# Patient Record
Sex: FEMALE | Race: WHITE
[De-identification: names, ages, dates, MRNs, and addresses within clinical notes are randomized per-mention and may not be internally consistent; named-entity substitution may affect disease eponyms.]

---

## 2017-12-14 ENCOUNTER — HOSPITAL ENCOUNTER (OUTPATIENT)
Dept: HOSPITAL 17 - CPRE | Age: 77
End: 2017-12-14
Attending: OBSTETRICS & GYNECOLOGY
Payer: MEDICARE

## 2017-12-14 DIAGNOSIS — Z01.810: Primary | ICD-10-CM

## 2017-12-14 DIAGNOSIS — C54.1: ICD-10-CM

## 2017-12-14 DIAGNOSIS — Z01.811: ICD-10-CM

## 2017-12-14 DIAGNOSIS — Z01.812: ICD-10-CM

## 2017-12-14 LAB
ALP SERPL-CCNC: 67 U/L (ref 45–117)
ALT SERPL-CCNC: 32 U/L (ref 10–53)
ANION GAP SERPL CALC-SCNC: 4 MEQ/L (ref 5–15)
APTT BLD: 24.7 SEC (ref 24.3–30.1)
AST SERPL-CCNC: 29 U/L (ref 15–37)
BASOPHILS # BLD AUTO: 0 TH/MM3 (ref 0–0.2)
BASOPHILS NFR BLD: 0.6 % (ref 0–2)
BILIRUB SERPL-MCNC: 0.4 MG/DL (ref 0.2–1)
BUN SERPL-MCNC: 8 MG/DL (ref 7–18)
CHLORIDE SERPL-SCNC: 100 MEQ/L (ref 98–107)
EOSINOPHIL # BLD: 0.1 TH/MM3 (ref 0–0.4)
EOSINOPHIL NFR BLD: 1.7 % (ref 0–4)
ERYTHROCYTE [DISTWIDTH] IN BLOOD BY AUTOMATED COUNT: 13.3 % (ref 11.6–17.2)
GFR SERPLBLD BASED ON 1.73 SQ M-ARVRAT: 86 ML/MIN (ref 89–?)
HCO3 BLD-SCNC: 29.7 MEQ/L (ref 21–32)
HCT VFR BLD CALC: 38.2 % (ref 35–46)
HEMO FLAGS: (no result)
INR PPP: 1 RATIO
LYMPHOCYTES # BLD AUTO: 2 TH/MM3 (ref 1–4.8)
LYMPHOCYTES NFR BLD AUTO: 28.8 % (ref 9–44)
MCH RBC QN AUTO: 31.3 PG (ref 27–34)
MCHC RBC AUTO-ENTMCNC: 33.8 % (ref 32–36)
MCV RBC AUTO: 92.7 FL (ref 80–100)
MONOCYTES NFR BLD: 13 % (ref 0–8)
NEUTROPHILS # BLD AUTO: 3.9 TH/MM3 (ref 1.8–7.7)
NEUTROPHILS NFR BLD AUTO: 55.9 % (ref 16–70)
PLATELET # BLD: 289 TH/MM3 (ref 150–450)
POTASSIUM SERPL-SCNC: 4.6 MEQ/L (ref 3.5–5.1)
PROTHROMBIN TIME: 10 SEC (ref 9.8–11.6)
RBC # BLD AUTO: 4.12 MIL/MM3 (ref 4–5.3)
SODIUM SERPL-SCNC: 134 MEQ/L (ref 136–145)
WBC # BLD AUTO: 7.1 TH/MM3 (ref 4–11)

## 2017-12-14 PROCEDURE — 80053 COMPREHEN METABOLIC PANEL: CPT

## 2017-12-14 PROCEDURE — 85025 COMPLETE CBC W/AUTO DIFF WBC: CPT

## 2017-12-14 PROCEDURE — 93005 ELECTROCARDIOGRAM TRACING: CPT

## 2017-12-14 PROCEDURE — 36415 COLL VENOUS BLD VENIPUNCTURE: CPT

## 2017-12-14 PROCEDURE — 85610 PROTHROMBIN TIME: CPT

## 2017-12-14 PROCEDURE — 85730 THROMBOPLASTIN TIME PARTIAL: CPT

## 2017-12-14 PROCEDURE — 71020: CPT

## 2017-12-14 NOTE — RADRPT
EXAM DATE/TIME:  12/14/2017 15:59 

 

[facility] COMPARISON:     

No previous studies available for comparison.

 

                     

INDICATIONS :     

Evaluate for pneumonia, pneumothorax, or communicable disease. Pre op hysteractomy.

                     

 

MEDICAL HISTORY :     

None.          

 

SURGICAL HISTORY :     

None.   

 

ENCOUNTER:     

Initial                                        

 

ACUITY:     

1 day      

 

PAIN SCORE:     

0/10

 

LOCATION:     

Bilateral chest 

 

FINDINGS:     

PA and lateral views of the chest demonstrate the lungs to be symmetrically aerated without evidence 
of mass, infiltrate or effusion.  The cardiomediastinal contours are unremarkable.  Osseous structure
s are intact.

 

CONCLUSION:     

1. No acute cardiopulmonary disease.

 

 

 

 Nicholas Jeter MD on December 14, 2017 at 16:23           

Board Certified Radiologist.

 This report was verified electronically.

## 2017-12-16 NOTE — EKG
Date Performed: 12/14/2017       Time Performed: 15:13:08

 

PTAGE:      77 years

 

EKG:      Sinus rhythm 

 

 Normal ECG 

 

NO PREVIOUS TRACING            

 

DOCTOR:   Mary Beckett  Interpretating Date/Time  12/16/2017 12:16:43

## 2018-01-11 ENCOUNTER — HOSPITAL ENCOUNTER (OUTPATIENT)
Dept: HOSPITAL 17 - HSDC | Age: 78
Setting detail: OBSERVATION
LOS: 1 days | Discharge: HOME | End: 2018-01-12
Attending: OBSTETRICS & GYNECOLOGY | Admitting: OBSTETRICS & GYNECOLOGY
Payer: MEDICARE

## 2018-01-11 VITALS
SYSTOLIC BLOOD PRESSURE: 122 MMHG | TEMPERATURE: 98.3 F | HEART RATE: 77 BPM | RESPIRATION RATE: 17 BRPM | DIASTOLIC BLOOD PRESSURE: 57 MMHG | OXYGEN SATURATION: 98 %

## 2018-01-11 VITALS — HEART RATE: 80 BPM

## 2018-01-11 VITALS
TEMPERATURE: 98.4 F | OXYGEN SATURATION: 98 % | SYSTOLIC BLOOD PRESSURE: 138 MMHG | RESPIRATION RATE: 18 BRPM | DIASTOLIC BLOOD PRESSURE: 77 MMHG | HEART RATE: 85 BPM

## 2018-01-11 VITALS — HEART RATE: 82 BPM

## 2018-01-11 VITALS
TEMPERATURE: 98 F | OXYGEN SATURATION: 100 % | HEART RATE: 87 BPM | RESPIRATION RATE: 20 BRPM | SYSTOLIC BLOOD PRESSURE: 150 MMHG | DIASTOLIC BLOOD PRESSURE: 71 MMHG

## 2018-01-11 VITALS — HEART RATE: 84 BPM

## 2018-01-11 VITALS — HEIGHT: 68 IN | WEIGHT: 114.64 LBS | BODY MASS INDEX: 17.37 KG/M2

## 2018-01-11 VITALS — HEART RATE: 86 BPM

## 2018-01-11 DIAGNOSIS — E78.5: ICD-10-CM

## 2018-01-11 DIAGNOSIS — N80.0: ICD-10-CM

## 2018-01-11 DIAGNOSIS — N95.0: ICD-10-CM

## 2018-01-11 DIAGNOSIS — I10: ICD-10-CM

## 2018-01-11 DIAGNOSIS — Z79.82: ICD-10-CM

## 2018-01-11 DIAGNOSIS — C54.1: Primary | ICD-10-CM

## 2018-01-11 PROCEDURE — 80048 BASIC METABOLIC PNL TOTAL CA: CPT

## 2018-01-11 PROCEDURE — C1769 GUIDE WIRE: HCPCS

## 2018-01-11 PROCEDURE — 00840 ANES IPER PX LOWER ABD NOS: CPT

## 2018-01-11 PROCEDURE — 85025 COMPLETE CBC W/AUTO DIFF WBC: CPT

## 2018-01-11 PROCEDURE — 86900 BLOOD TYPING SEROLOGIC ABO: CPT

## 2018-01-11 PROCEDURE — 96376 TX/PRO/DX INJ SAME DRUG ADON: CPT

## 2018-01-11 PROCEDURE — 94150 VITAL CAPACITY TEST: CPT

## 2018-01-11 PROCEDURE — 96374 THER/PROPH/DIAG INJ IV PUSH: CPT

## 2018-01-11 PROCEDURE — G0378 HOSPITAL OBSERVATION PER HR: HCPCS

## 2018-01-11 PROCEDURE — 58552 LAPARO-VAG HYST INCL T/O: CPT

## 2018-01-11 PROCEDURE — 96375 TX/PRO/DX INJ NEW DRUG ADDON: CPT

## 2018-01-11 PROCEDURE — 88309 TISSUE EXAM BY PATHOLOGIST: CPT

## 2018-01-11 PROCEDURE — 88112 CYTOPATH CELL ENHANCE TECH: CPT

## 2018-01-11 PROCEDURE — 86850 RBC ANTIBODY SCREEN: CPT

## 2018-01-11 PROCEDURE — 86901 BLOOD TYPING SEROLOGIC RH(D): CPT

## 2018-01-11 PROCEDURE — 88331 PATH CONSLTJ SURG 1 BLK 1SPC: CPT

## 2018-01-11 PROCEDURE — 96361 HYDRATE IV INFUSION ADD-ON: CPT

## 2018-01-11 RX ADMIN — CLONIDINE HYDROCHLORIDE SCH MG: 0.1 INJECTION, SOLUTION EPIDURAL at 21:17

## 2018-01-11 RX ADMIN — POTASSIUM CHLORIDE, DEXTROSE MONOHYDRATE AND SODIUM CHLORIDE SCH MLS/HR: 150; 5; 450 INJECTION, SOLUTION INTRAVENOUS at 21:17

## 2018-01-11 RX ADMIN — POTASSIUM CHLORIDE, DEXTROSE MONOHYDRATE AND SODIUM CHLORIDE SCH MLS/HR: 150; 5; 450 INJECTION, SOLUTION INTRAVENOUS at 11:00

## 2018-01-11 RX ADMIN — CLONIDINE HYDROCHLORIDE SCH MG: 0.1 INJECTION, SOLUTION EPIDURAL at 15:30

## 2018-01-12 VITALS
TEMPERATURE: 98.4 F | OXYGEN SATURATION: 97 % | RESPIRATION RATE: 16 BRPM | HEART RATE: 76 BPM | DIASTOLIC BLOOD PRESSURE: 62 MMHG | SYSTOLIC BLOOD PRESSURE: 129 MMHG

## 2018-01-12 VITALS
TEMPERATURE: 97.7 F | HEART RATE: 80 BPM | RESPIRATION RATE: 16 BRPM | OXYGEN SATURATION: 98 % | SYSTOLIC BLOOD PRESSURE: 123 MMHG | DIASTOLIC BLOOD PRESSURE: 66 MMHG

## 2018-01-12 VITALS
OXYGEN SATURATION: 96 % | DIASTOLIC BLOOD PRESSURE: 58 MMHG | SYSTOLIC BLOOD PRESSURE: 117 MMHG | HEART RATE: 82 BPM | RESPIRATION RATE: 16 BRPM | TEMPERATURE: 98.6 F

## 2018-01-12 LAB
BASOPHILS # BLD AUTO: 0 TH/MM3 (ref 0–0.2)
BASOPHILS NFR BLD: 0.2 % (ref 0–2)
BUN SERPL-MCNC: 16 MG/DL (ref 7–18)
CALCIUM SERPL-MCNC: 7.9 MG/DL (ref 8.5–10.1)
CHLORIDE SERPL-SCNC: 102 MEQ/L (ref 98–107)
CREAT SERPL-MCNC: 0.77 MG/DL (ref 0.5–1)
EOSINOPHIL # BLD: 0 TH/MM3 (ref 0–0.4)
EOSINOPHIL NFR BLD: 0 % (ref 0–4)
ERYTHROCYTE [DISTWIDTH] IN BLOOD BY AUTOMATED COUNT: 13.2 % (ref 11.6–17.2)
GFR SERPLBLD BASED ON 1.73 SQ M-ARVRAT: 72 ML/MIN (ref 89–?)
GLUCOSE SERPL-MCNC: 168 MG/DL (ref 74–106)
HCO3 BLD-SCNC: 21.7 MEQ/L (ref 21–32)
HCT VFR BLD CALC: 31.8 % (ref 35–46)
HGB BLD-MCNC: 11 GM/DL (ref 11.6–15.3)
LYMPHOCYTES # BLD AUTO: 1.3 TH/MM3 (ref 1–4.8)
LYMPHOCYTES NFR BLD AUTO: 11 % (ref 9–44)
MCH RBC QN AUTO: 31.7 PG (ref 27–34)
MCHC RBC AUTO-ENTMCNC: 34.6 % (ref 32–36)
MCV RBC AUTO: 91.6 FL (ref 80–100)
MONOCYTE #: 1.5 TH/MM3 (ref 0–0.9)
MONOCYTES NFR BLD: 13.2 % (ref 0–8)
NEUTROPHILS # BLD AUTO: 8.9 TH/MM3 (ref 1.8–7.7)
NEUTROPHILS NFR BLD AUTO: 75.6 % (ref 16–70)
PLATELET # BLD: 237 TH/MM3 (ref 150–450)
PMV BLD AUTO: 7.6 FL (ref 7–11)
RBC # BLD AUTO: 3.47 MIL/MM3 (ref 4–5.3)
SODIUM SERPL-SCNC: 132 MEQ/L (ref 136–145)
WBC # BLD AUTO: 11.7 TH/MM3 (ref 4–11)

## 2018-01-12 RX ADMIN — CLONIDINE HYDROCHLORIDE SCH MG: 0.1 INJECTION, SOLUTION EPIDURAL at 08:43

## 2018-01-12 RX ADMIN — CLONIDINE HYDROCHLORIDE SCH MG: 0.1 INJECTION, SOLUTION EPIDURAL at 04:25

## 2018-01-12 NOTE — MP
cc:

LAURA GUTIERREZ MD, JOHN D. M.D. BROWN,ABDOULAYE ANDERSEN M.D.

****

 

 

DATE OF SURGERY:

1/11/2018

 

PREOPERATIVE DIAGNOSIS:

Grade 1 endometrial cancer with postmenopausal bleeding.

 

POSTOPERATIVE DIAGNOSIS

Grade 1 endometrial cancer with postmenopausal bleeding.

 

PROCEDURE

Robotic-assisted laparoscopic hysterectomy bilateral salpingo-oophorectomy.

 

SURGEON

Laura Gutierrez MD

 

FIRST ASSISTANT

[place] first assistant

 

ANESTHESIA:

General endotracheal anesthesia

 

ESTIMATED BLOOD LOSS

50 cc

 

IV FLUIDS

1100 cc

 

URINE OUTPUT

25 cc

 

HISTORY

This is a 77-year-old female who has left hip pain.  She had a surgical

procedure 12 years ago and in late and has done well until recently has been

bothering her some decreased range of motion.  Pain.  She was undergoing

evaluation including x-rays

 

In the process of getting imaging of the pelvis a mass-like thickening to the

middle of the uterus within the endometrium was noted and although she had not

had any bleeding or spotting.  She was referred to Gynecology.

 

 

 

She saw Dr. Abdoulaye Baez in [place] who performed sampling that showed

benign endocervix complex atypical endometrial hyperplasia with areas

consistent with a grade 1 endometrial adenocarcinoma.

 

She was seen in our office counseled and was in favor definitive surgical

management.  She is seen again today in the preop holding area accompanied by

her ,  where I again reviewed findings summarized our plan. I hope we

can accomplish this laparoscopically, robotically and the potential need for

surgical staging.

 

The pros, risk, benefits were again reviewed.  Questions were answered.  She

expressed good understanding wanted move forward with surgery.

 

PROCEDURE

She was taken to the operating room placed in dorsal lithotomy position after

general endotracheal anesthesia was administered time-out was undertaken.  She

was identified by sight recognition and hospital ID bracelet and the proposed

procedure was reviewed and confirmed.

 

She was carefully positioned in padded Ernesto stirrups.  Her arms padded and

secured to the sides.  She was further secured to the operating table with egg

crate padding and tape in across chest over the shoulder fashion.  All sites

were noted be properly aligned with no malalignments or pressure points.

 

She was prepped, draped sterile fashion, placed in lithotomy position, cervix

grasped, uterine cavity sounded to 6 cm.  Small Vcare manipulator inserted and

secured usual fashion.  Chamberlain catheter placed in bladder.  She was returned to

low lithotomy position change of sterile gloves was undertaken and we confirmed

an orogastric tube in the stomach on suction.

 

With manual elevation of the abdominal wall under direct laparoscopic

visualization a 5 mm cannula was introduced into the left upper quadrant in an

atraumatic fashion.  Carbon dioxide gas was insufflated and under visualization

a 12 mm cannulas placed in midline above the umbilicus 8 mm cannula placed in

the right upper quadrant and left lateral quadrant. The original 5 mm was

exchanged for a 8-mm cannula.

 

 

 

 

 

 

She was placed in Trendelenburg position.  Peritoneal washings were obtained

for cytology.  The anatomy was explored with findings as described above.

Small bowel was folded back on its mesenteric root and three Ray-Daly sponges

were placed around the root of the small bowel mesentery.

 

Robotic system brought into the operative field, it was attached in the usual

fashion.  Monopolar scissors, fenestrated bipolar forceps and prograsp

manipulators placed in arms #one, two and three respectively and I took my

place at the surgeon's console.

 

The right round ligament isolated, cauterized transected anterior and posterior

leaflets of the broad ligament were opened. The right ureter was identified.

The right infundibulopelvic ligament was isolated.  The intervening peritoneum

was opened.  The infundibulopelvic ligament was dissected to the level of

pelvic brim where it was cauterized and transected.

 

Posterior peritoneum opened along the right side of uterus and cervix and the

right vesicouterine peritoneum dissected off the lower uterine segment and

cervix.  The right uterine vessels were skeletonized, cauterized and transected

as were the cardinal paracervical and uterosacral ligaments.

 

Attention was directed toward the left side where the left round ligament was

isolated, cauterized transected anterior and posterior leaflets of the broad

ligament were opened.  The left ureter was identified, left infundibulopelvic

ligament was isolated.  The intervening peritoneum was opened as the

infundibulopelvic ligament was isolated to the level of the pelvic brim where

was cauterized and transected.

 

Posterior peritoneum opened along the left side of uterus and cervix left

vesicouterine peritoneum dissected off the lower uterine segment and cervix.

The left uterine vessels were skeletonized, cauterized and transected as were

the cardinal paracervical and uterosacral ligaments.

 

Circumferential colpotomy was performed.  The specimen was delivered trans

vaginally which included uterus, cervix, tubes and ovaries.  A pneumo occluder

balloon was placed in the vagina to maintain pneumoperitoneum.

 

Instruments one and three exchanged for needle drivers as a 0 Vicryl suture was

introduced.  The vaginal cuff was closed starting at the left corner

full-thickness closure incorporating full-thickness of the vaginal wall,

posterior peritoneum and uterosacral ligament tied via instrument tie.  The

closure was held on countertraction as a full-thickness running continuous

closure was carried across the vaginal cuff to the contralateral corner where

it was similarly fixed, secured, tied via instrument tie.  The needle was cut

and removed.

 

The bladder integrity was checked by filling the bladder with saline dyed with

methylene blue.  The bladder distended nicely under pressure.  There were no

areas of thinning of the bladder.  No blue dye visible certainly no

extravasation of dye.  Good peristalsis of ureters bilaterally and a good

margin between the edge of the bladder.  The vaginal cuff suture line.  The

bladder was drained.

 

Some superficial irritation to the peritoneum adjacent to the bladder was

oversewn with interrupted 3-0 Vicryl suture to rendered hemostatic, tied via

instrument tie.  The needle was cut and removed.

 

Pathology came back showing no evidence of invasive cancer in fact there was no

obvious cancer remaining in the endometrium such, it was felt that all

reasonable surgical objectives had been completed and attention was directed

toward closing.

 

Robotic instruments were removed were removed.  The robotic system was

disengaged from the operative field.  I reentered the bedside under sterile

condition.  Each of the three Ray-Daly sponges that were placed in the

peritoneal cavity were removed laparoscopically through the 12-mm cannula.

Each were inspected and noted to be removed in their entirety.

 

The peritoneal cavity sites were hemostatic.  There were no remaining foreign

objects in the peritoneum.  Preliminary counts were correct.  5 mm fascial

defect was closed with 0 Vicryl sutures interrupted with the needle pass fascia

closure apparatus they were tied securely which rendered the fascia completely

airtight and hemostatic.  The remaining cannulas were withdrawn.  Carbon

dioxide gas was removed.  3-0 Vicryl subcutaneous 3-0 Vicryl subcuticular and

Steri-Strips used to close these incisions.

 

She was returned to dorsal lithotomy position.  Pelvic exam confirmed that the

vaginal cuff was well-supported and  hemostatic.  There were no vaginal

lacerations, there was some superficial mucosal irritation at the introitus,

oozing slightly which rendered hemostatic with topical silver nitrate.

 

No remaining foreign objects in the vagina.  Final counts were correct.  She

was returned to dorsal supine position pending reversal of anesthesia when I

left operating room to precede her to the Post Anesthesia Care Unit.

 

 

                              _________________________________

                              MD MIKE Barton/[place]

D:  1/11/2018/4:07 PM

T:  1/12/2018/1:20 PM

Visit #:  [id number]

Job #:  [id number]

## 2018-01-14 NOTE — MD
cc:

JUAN MCMULLEN M.D., KELLY L. MD SCHNEIDER, JULIE D. MD BOLLA, JOHN D. M.D.

****

 

 

 

ADMISSION DATE: 1/11/2018    DISCHARGE DATE:  1/12/2018

 

 

PROCEDURE:

01/11/2018, robotic-assisted laparoscopic hysterectomy, bilateral

salpingo-oophorectomy.

 

DIAGNOSIS:

Endometrial cancer.

 

HOSPITAL COURSE:

Did well in early postop period hemodynamically stable tolerating oral intake

pending removal of Chamberlain and voiding.  No new complaints.

 

OBJECTIVE:

In's and out's: 2460/525.

 

Labs: H&H this morning 11 and 31.8.

 

Electrolytes show potassium of 4.7, BUN and creatinine 16 and 0.77.

 

PHYSICAL EXAMINATION

Afebrile, pulse 76-87, blood pressure 117-150/58-71, 02 saturations greater

than or equal to 96%. Alert, oriented x3.  Lungs were clear except for scant

mild basilar rales.  Cardiovascular - regular rate and rhythm.  The abdomen is

nondistended and nontender.  The incisions are clean and dry.  GYN - no active

bleeding or discharge.  Extremities nontender.

 

ASSESSMENT:

Postop day #1.

 

FINDINGS AT THE TIME OF SURGERY:

Preliminary pathology reviewed, activities restrictions discussed.  Questions

were answered.  She expressed good understanding.

 

PLAN:

Anticipate she will be ready for discharge to home today. She is to resume

prior medications and will have a prescription for Tramadol for pain or to take

over-the-counter medications alternatively.  Our office number was again given

to her and she has been asked to contact our office to schedule follow up

within two weeks or if she has any questions or problems between now and the

time of scheduled follow up.

 

 

 

                              _________________________________

                              MD MIKE Barton/FRED

D:  1/12/2018/6:27 AM

T:  1/14/2018/7:20 PM

Visit #:  [id number]

Job #:  [id number]